# Patient Record
Sex: FEMALE | Race: WHITE | Employment: FULL TIME | URBAN - METROPOLITAN AREA
[De-identification: names, ages, dates, MRNs, and addresses within clinical notes are randomized per-mention and may not be internally consistent; named-entity substitution may affect disease eponyms.]

---

## 2017-04-03 ENCOUNTER — OFFICE VISIT (OUTPATIENT)
Dept: FAMILY MEDICINE | Facility: CLINIC | Age: 27
End: 2017-04-03
Payer: COMMERCIAL

## 2017-04-03 VITALS
DIASTOLIC BLOOD PRESSURE: 76 MMHG | HEIGHT: 67 IN | BODY MASS INDEX: 26.87 KG/M2 | SYSTOLIC BLOOD PRESSURE: 118 MMHG | WEIGHT: 171.2 LBS | TEMPERATURE: 98.7 F | HEART RATE: 76 BPM

## 2017-04-03 DIAGNOSIS — K64.4 EXTERNAL HEMORRHOIDS: Primary | ICD-10-CM

## 2017-04-03 DIAGNOSIS — K64.8 INTERNAL HEMORRHOIDS: ICD-10-CM

## 2017-04-03 PROCEDURE — 99213 OFFICE O/P EST LOW 20 MIN: CPT | Performed by: NURSE PRACTITIONER

## 2017-04-03 NOTE — NURSING NOTE
"Chief Complaint   Patient presents with     Constipation       Initial /76 (BP Location: Right arm, Patient Position: Chair, Cuff Size: Adult Regular)  Pulse 76  Temp 98.7  F (37.1  C) (Tympanic)  Ht 5' 7\" (1.702 m)  Wt 171 lb 3.2 oz (77.7 kg)  LMP 03/15/2017 (Exact Date)  Breastfeeding? No  BMI 26.81 kg/m2 Estimated body mass index is 26.81 kg/(m^2) as calculated from the following:    Height as of this encounter: 5' 7\" (1.702 m).    Weight as of this encounter: 171 lb 3.2 oz (77.7 kg).  Medication Reconciliation: complete     Sol Sena CMA (AAMA)      "

## 2017-04-03 NOTE — PATIENT INSTRUCTIONS
You do have both internal and external hemorrhoids  Stay well hydrated - urine should be clear.      Try a stool softer,    Try a probiotic,     Keep exercising,    try to not strain /push too hard with bowel movement s.     Use the Anusol suppositories for the internal hemorrhoids and the cream externally

## 2017-04-03 NOTE — PROGRESS NOTES
SUBJECTIVE:                                                    Natty Toussaint is a 26 year old female who presents to clinic today for the following health issues:      Constipation     Onset: 1 1/2 weeks ago    Description:   Frequency of bowel movements: had been very regular and is still having BMs daily but is feeling that she is having to try harder to go.  Stool consistency: Stool does not seem to have gotten harder, but seems narrower than she is used to     Progression of Symptoms:  improving    Accompanying Signs & Symptoms:  Abdominal pain (cramping?): no   Blood in stool: YES- has had light red blood when wiping after BM before. Has had hemorrhoids in the past. Did have a normal colonoscopy in 2014  Rectal pain: no   Nausea/vomiting: YES- intermittent nausea  Weight loss or gain: no    History:   History of abdominal surgery: no     Precipitating factors:   Recent use of narcotics, anticholinergics, calcium channel blockers, antacids, or iron supplements: no   Chronic Laxative Use: no        Therapies Tried and outcome: laxatives and stool softeners - does feel that the laxative made her stomach feel better more than the stool softener, but still not feeling quite like herself.     Had the colonoscopy when she was anemic and that  Was fine,     Problem list and histories reviewed & adjusted, as indicated.  Additional history: as documented    Patient Active Problem List   Diagnosis     Seasonal allergic rhinitis     Viral wart     CARDIOVASCULAR SCREENING; LDL GOAL LESS THAN 160     Well woman exam with routine gynecological exam     Iron deficiency anemia     Past Surgical History:   Procedure Laterality Date     COLONOSCOPY  7/30/2014    Procedure: COLONOSCOPY;  Surgeon: Naveed Choi MD;  Location: [facility]     ESOPHAGOSCOPY, GASTROSCOPY, DUODENOSCOPY (EGD), COMBINED  7/30/2014    Procedure: COMBINED ESOPHAGOSCOPY, GASTROSCOPY, DUODENOSCOPY (EGD);  Surgeon: Naveed Choi MD;   Location: [facility]     HC CAPSULE ENDOSCOPY N/A 1/14/2015    Procedure: CAPSULE/PILL CAM ENDOSCOPY;  Surgeon: Trever Laws MD;  Location: [facility]     NO HISTORY OF SURGERY         Social History   Substance Use Topics     Smoking status: Never Smoker     Smokeless tobacco: Never Used     Alcohol use Yes      Comment: occasional, on weekends     Family History   Problem Relation Age of Onset     DIABETES Maternal Grandmother      C.A.D. Maternal Grandfather      CANCER Maternal Grandfather      pancreatic ca     Thyroid Disease Father      Breast Cancer Maternal Aunt      Prostate Cancer Maternal Uncle      CEREBROVASCULAR DISEASE No family hx of      Cancer - colorectal No family hx of      Hypertension No family hx of      Asthma No family hx of          Current Outpatient Prescriptions   Medication Sig Dispense Refill     UNABLE TO FIND MEDICATION NAME: Patient states that she takes another thyroid medication in additional to levothyroxine, can not recall the name.       norgestrel-ethinyl estradiol (LO/OVRAL) 0.3-30 MG-MCG per tablet Take 1 tablet by mouth daily Medication is being filled for 1 time refill only due to:  Patient needs to be seen because it has been more than one year since last visit. 84 tablet 0     levothyroxine (SYNTHROID, LEVOTHROID) 50 MCG tablet   3     VITAMIN D, CHOLECALCIFEROL, PO Take 1,000 Units by mouth daily       Ascorbic Acid (VITAMIN C PO) Take 500 mg by mouth daily       IBUPROFEN PO Take 600 mg by mouth every 4 hours as needed for moderate pain Reported on 4/3/2017       SUMAtriptan (IMITREX) 50 MG tablet Take 1 tablet (50 mg) by mouth at onset of headache for migraine May repeat dose in 2 hours.  Do not exceed 200 mg in 24 hours (Patient not taking: Reported on 4/3/2017) 9 tablet 1     No Known Allergies  BP Readings from Last 3 Encounters:   04/03/17 118/76   08/04/16 124/86   01/27/16 109/66    Wt Readings from Last 3 Encounters:   04/03/17 171 lb 3.2 oz (77.7 kg)  "  08/04/16 161 lb 9.6 oz (73.3 kg)   01/27/16 166 lb 9.6 oz (75.6 kg)                    Reviewed and updated as needed this visit by clinical staff  Tobacco  Allergies  Meds  Med Hx  Surg Hx  Fam Hx  Soc Hx      Reviewed and updated as needed this visit by Provider         ROS:  C: NEGATIVE for fever, chills, change in weight  E/M: NEGATIVE for ear, mouth and throat problems,  Just started with a cold,  Runny, stuffy nose  R: NEGATIVE for significant cough or SOB  CV: NEGATIVE for chest pain, palpitations or peripheral edema  GI: POSITIVE for constipation  Started 1 1/2 weeks ago with  Hard stools, and straining with BM . The caliber of the stools is thinner .    Is training for a 1/2 marathon.  Will be getting  - Aug .  Most of the planning is done,   Is working 2 jobs.  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  Did have some blood in stool /aneamia  2 1/2 years ago and did have a colonoscopy and endoscopy   Testing is done.    All of the test were normal.   Didn't really fine out what was causing the anemia   Diet now is good.   Eat suhail seeds daily.   Has bee feeling more gassy            OBJECTIVE:                                                    /76 (BP Location: Right arm, Patient Position: Chair, Cuff Size: Adult Regular)  Pulse 76  Temp 98.7  F (37.1  C) (Tympanic)  Ht 5' 7\" (1.702 m)  Wt 171 lb 3.2 oz (77.7 kg)  LMP 03/15/2017 (Exact Date)  Breastfeeding? No  BMI 26.81 kg/m2  Body mass index is 26.81 kg/(m^2).  GENERAL: healthy, alert and no distress  HENT: normal cephalic/atraumatic, right ear: normal: no effusions, no erythema, normal landmarks, left ear: clear effusion, nasal mucosa edematous , rhinorrhea clear, oropharynx clear, oral mucous membranes moist and sinuses: not tender  NECK: no adenopathy, no asymmetry, masses, or scars and thyroid normal to palpation  RESP: lungs clear to auscultation - no rales, rhonchi or wheezes  CV: regular rate and rhythm, " normal S1 S2, no S3 or S4, no murmur, click or rub, no peripheral edema and peripheral pulses strong  ABDOMEN: soft, nontender, without hepatosplenomegaly or masses and bowel sounds normal  RECTAL (female): 1 externa at 6  o'clock hemorrhoid , rectal scope done.  There are multiple internal hemrrhoids that are inflamed  No active bleeding     Diagnostic Test Results:  none      ASSESSMENT/PLAN:                                                      ASSESSMENT/PLAN:      ICD-10-CM    1. External hemorrhoids K64.4 DISCONTINUED: hydrocortisone (ANUSOL-HC) 2.5 % cream   2. Internal hemorrhoids K64.8 DISCONTINUED: hydrocortisone (ANUSOL-HC) 2.5 % cream     DISCONTINUED: hydrocortisone (ANUSOL-HC) 2.5 % cream       Patient Instructions   You do have both internal and external hemorrhoids  Stay well hydrated - urine should be clear.      Try a stool softer,    Try a probiotic,     Keep exercising,    try to not strain /push too hard with bowel movement s.     Use the Anusol suppositories for the internal hemorrhoids and the cream externally                   MEDICATIONS:        - Start taking Anusol cream /suppositories        - Continue other medications without change  See Patient Instructions    ZACH LAM NP, APRN [facility]

## 2017-04-03 NOTE — MR AVS SNAPSHOT
After Visit Summary   4/3/2017    Natty Toussaint    MRN: [id number]           Patient Information     Date Of Birth          1990        Visit Information        Provider Department      4/3/2017 3:20 PM Macy Parsons APRN [facility]        Today's Diagnoses     External hemorrhoids    -  1    Internal hemorrhoids          Care Instructions    You do have both internal and external hemorrhoids  Stay well hydrated - urine should be clear.      Try a stool softer,    Try a probiotic,     Keep exercising,    try to not strain /push too hard with bowel movement s.     Use the Anusol suppositories for the internal hemorrhoids and the cream externally          Follow-ups after your visit        Who to contact     Normal or non-critical lab and imaging results will be communicated to you by [employer], letter or phone within 4 business days after the clinic has received the results. If you do not hear from us within 7 days, please contact the clinic through [employer] or phone. If you have a critical or abnormal lab result, we will notify you by phone as soon as possible.  Submit refill requests through [employer] or call your pharmacy and they will forward the refill request to us. Please allow 3 business days for your refill to be completed.          If you need to speak with a  for additional information , please call: [phone]           Additional Information About Your Visit        [employer] Information     [employer] gives you secure access to your electronic health record. If you see a primary care provider, you can also send messages to your care team and make appointments. If you have questions, please call your primary care clinic.  If you do not have a primary care provider, please call [phone] and they will assist you.        Care EveryWhere ID     This is your Care EveryWhere ID. This could be used by other organizations to access your [place]  "medical records  [id number]        Your Vitals Were     Pulse Temperature Height Last Period Breastfeeding? BMI (Body Mass Index)    76 98.7  F (37.1  C) (Tympanic) 5' 7\" (1.702 m) 03/15/2017 (Exact Date) No 26.81 kg/m2       Blood Pressure from Last 3 Encounters:   04/03/17 118/76   08/04/16 124/86   01/27/16 109/66    Weight from Last 3 Encounters:   04/03/17 171 lb 3.2 oz (77.7 kg)   08/04/16 161 lb 9.6 oz (73.3 kg)   01/27/16 166 lb 9.6 oz (75.6 kg)              Today, you had the following     No orders found for display         Today's Medication Changes          These changes are accurate as of: 4/3/17  4:33 PM.  If you have any questions, ask your nurse or doctor.               Start taking these medicines.        Dose/Directions    * hydrocortisone 2.5 % cream   Commonly known as:  ANUSOL-HC   Used for:  External hemorrhoids, Internal hemorrhoids   Started by:  Macy Parsons APRN CNP        Place rectally 2 times daily   Quantity:  30 g   Refills:  1       * hydrocortisone 2.5 % cream   Commonly known as:  ANUSOL-HC   Used for:  Internal hemorrhoids   Started by:  Macy Parsons APRN CNP        Place rectally 2 times daily   Quantity:  30 g   Refills:  3       * Notice:  This list has 2 medication(s) that are the same as other medications prescribed for you. Read the directions carefully, and ask your doctor or other care provider to review them with you.      Stop taking these medicines if you haven't already. Please contact your care team if you have questions.     ferrous sulfate 325 (65 FE) MG tablet   Commonly known as:  IRON   Stopped by:  Macy Parsons APRN CNP                Where to get your medicines      These medications were sent to [id number] - [place], [place] - [address] [place] [address]    Hours:  24-hours Phone:  [phone]     hydrocortisone 2.5 % cream    hydrocortisone 2.5 " % cream                Primary Care Provider Office Phone # Fax #    Zoya Delgado [phone]       [place] [id number] [place] [address]        Thank you!     Thank you for choosing [facility]  for your care. Our goal is always to provide you with excellent care. Hearing back from our patients is one way we can continue to improve our services. Please take a few minutes to complete the written survey that you may receive in the mail after your visit with us. Thank you!             Your Updated Medication List - Protect others around you: Learn how to safely use, store and throw away your medicines at www.disposemymeds.org.          This list is accurate as of: 4/3/17  4:33 PM.  Always use your most recent med list.                   Brand Name Dispense Instructions for use    * hydrocortisone 2.5 % cream    ANUSOL-HC    30 g    Place rectally 2 times daily       * hydrocortisone 2.5 % cream    ANUSOL-HC    30 g    Place rectally 2 times daily       IBUPROFEN PO      Take 600 mg by mouth every 4 hours as needed for moderate pain Reported on 4/3/2017       levothyroxine 50 MCG tablet    SYNTHROID/LEVOTHROID         norgestrel-ethinyl estradiol 0.3-30 MG-MCG per tablet    LO/OVRAL    84 tablet    Take 1 tablet by mouth daily Medication is being filled for 1 time refill only due to:  Patient needs to be seen because it has been more than one year since last visit.       SUMAtriptan 50 MG tablet    IMITREX    9 tablet    Take 1 tablet (50 mg) by mouth at onset of headache for migraine May repeat dose in 2 hours.  Do not exceed 200 mg in 24 hours       UNABLE TO FIND      MEDICATION NAME: Patient states that she takes another thyroid medication in additional to levothyroxine, can not recall the name.       VITAMIN C PO      Take 500 mg by mouth daily       VITAMIN D (CHOLECALCIFEROL) PO      Take 1,000 Units by mouth daily       * Notice:  This list has 2  medication(s) that are the same as other medications prescribed for you. Read the directions carefully, and ask your doctor or other care provider to review them with you.

## 2017-04-04 ENCOUNTER — TELEPHONE (OUTPATIENT)
Dept: FAMILY MEDICINE | Facility: CLINIC | Age: 27
End: 2017-04-04

## 2017-04-04 DIAGNOSIS — K64.8 INTERNAL HEMORRHOIDS: ICD-10-CM

## 2017-04-04 DIAGNOSIS — K64.4 EXTERNAL HEMORRHOIDS: Primary | ICD-10-CM

## 2017-04-04 NOTE — TELEPHONE ENCOUNTER
Patient called and wants to know why she was prescribed 2 hydrocortisone cream  when she was in yesterday.  Patient also would like to know if there is a generic form of the hydrocortisone cream its to expensive otherwise.  The pharmacy she wants it sent to [facility]e [facility]

## 2017-04-04 NOTE — TELEPHONE ENCOUNTER
Spoke provider and she stated the patient may have 3 refills.  Provider also recommended the hydrocortisone cream procto brand.  Writer sent in new rx for Procto brand.  Patient was notified of the additional refills and the change to the Procto brand for insurance.    Kimber LUI RN

## 2017-04-04 NOTE — TELEPHONE ENCOUNTER
Patient called LM that she was thinking she's getting an internal and external medication.  Would like to talk to the Nurse.    Jamee Ruelas [facility]

## 2017-04-28 ENCOUNTER — OFFICE VISIT (OUTPATIENT)
Dept: FAMILY MEDICINE | Facility: CLINIC | Age: 27
End: 2017-04-28
Payer: COMMERCIAL

## 2017-04-28 VITALS
HEART RATE: 79 BPM | DIASTOLIC BLOOD PRESSURE: 74 MMHG | HEIGHT: 67 IN | TEMPERATURE: 99.4 F | BODY MASS INDEX: 25.68 KG/M2 | WEIGHT: 163.6 LBS | SYSTOLIC BLOOD PRESSURE: 112 MMHG

## 2017-04-28 DIAGNOSIS — K59.00 CONSTIPATION, UNSPECIFIED CONSTIPATION TYPE: Primary | ICD-10-CM

## 2017-04-28 PROCEDURE — 99213 OFFICE O/P EST LOW 20 MIN: CPT | Performed by: FAMILY MEDICINE

## 2017-04-28 NOTE — NURSING NOTE
"Initial /74  Pulse 79  Temp 99.4  F (37.4  C) (Tympanic)  Ht 5' 7.25\" (1.708 m)  Wt 163 lb 9.6 oz (74.2 kg)  LMP 04/08/2017 (Exact Date)  Breastfeeding? No  BMI 25.43 kg/m2 Estimated body mass index is 25.43 kg/(m^2) as calculated from the following:    Height as of this encounter: 5' 7.25\" (1.708 m).    Weight as of this encounter: 163 lb 9.6 oz (74.2 kg). .    "

## 2017-04-28 NOTE — PROGRESS NOTES
SUBJECTIVE:                                                    Natty Toussaint is a 26 year old female who presents to clinic today for the following health issues:      Gastrointestinal symptoms      Duration: 1 month    Description:             ABDOMINAL PAIN - mid abdominal and lower back soreness.       Intensity:  moderate    Accompanying signs and symptoms:  Constipation and nausea    History  Previous {similar problem: YES- constipation. Pt taking stool softeners and that seems to be what is cause abdominal pain  Previous evaluation:  colonoscopy and upper GI    Aggravating factors: none    Alleviating factors: nothing    Other Therapies tried: exercises and lower fat diet     Started with consitpation that came out of the blue associated with nausea.      Still feeling the same after a couple of weeks.    Still having BM's regularly but can be difficult.  Stooling 1-2 times per day.  Consistency varies, sometimes runny and sometimes more formed.      No longer has nausea.  Has stabbing abdominal pain but only with stool sofetner.    Eating does not bother symptoms, feels like her appetite is normal.  Occasional bright red blood when wiping after BM.   Taking colace  Has not tried probitoc.     Problem list and histories reviewed & adjusted, as indicated.  Additional history: as documented    Reviewed and updated as needed this visit by clinical staff  Tobacco  Allergies  Meds  Med Hx  Surg Hx  Fam Hx  Soc Hx      Reviewed and updated as needed this visit by Provider  Tobacco  Med Hx  Surg Hx  Fam Hx  Soc Hx      ROS: Remainder of Constitutional, CV, Respiratory, GI,  negative with exception of that mentioned above    PE:  VS as above   Gen:  WN/WD/WH female in NAD   Heart:  RRR without murmur, nl S1, S2, no rubs or gallops   Lungs CTA zenobia without rales/ronchi/wheezes   Abd: soft, postive bowel sounds, NT/ND, no HSM, no rebounding/guarding/ridigity   Ext:  No pedal edema    A/p:      ICD-10-CM     1. Constipation, unspecified constipation type K59.00      Patient Instructions   We'll have you stop the Colace and begin some Benefiber once daily.  I would also recommend a probiotic.  Any of the over the counter ones stored with the pharmacist would be fine.    Let's give this another few weeks and see how it goes.  If no improvment we'll consider gastroenterology as our next step.    Please just keep me updated through [employer]

## 2017-04-28 NOTE — PATIENT INSTRUCTIONS
We'll have you stop the Colace and begin some Benefiber once daily.  I would also recommend a probiotic.  Any of the over the counter ones stored with the pharmacist would be fine.    Let's give this another few weeks and see how it goes.  If no improvment we'll consider gastroenterology as our next step.    Please just keep me updated through [employer]

## 2017-04-28 NOTE — MR AVS SNAPSHOT
After Visit Summary   4/28/2017    Natty Toussaint    MRN: [id number]           Patient Information     Date Of Birth          1990        Visit Information        Provider Department      4/28/2017 11:20 AM Zoya Delgado, DO [facility]        Care Instructions    We'll have you stop the Colace and begin some Benefiber once daily.  I would also recommend a probiotic.  Any of the over the counter ones stored with the pharmacist would be fine.    Let's give this another few weeks and see how it goes.  If no improvment we'll consider gastroenterology as our next step.    Please just keep me updated through [employer]            Follow-ups after your visit        Who to contact     Normal or non-critical lab and imaging results will be communicated to you by [employer], letter or phone within 4 business days after the clinic has received the results. If you do not hear from us within 7 days, please contact the clinic through [employer] or phone. If you have a critical or abnormal lab result, we will notify you by phone as soon as possible.  Submit refill requests through [employer] or call your pharmacy and they will forward the refill request to us. Please allow 3 business days for your refill to be completed.          If you need to speak with a  for additional information , please call: [phone]           Additional Information About Your Visit        [employer] Information     [employer] gives you secure access to your electronic health record. If you see a primary care provider, you can also send messages to your care team and make appointments. If you have questions, please call your primary care clinic.  If you do not have a primary care provider, please call [phone] and they will assist you.        Care EveryWhere ID     This is your Care EveryWhere ID. This could be used by other organizations to access your [place] medical records  [id number]        Your  "Vitals Were     Pulse Temperature Height Last Period Breastfeeding? BMI (Body Mass Index)    79 99.4  F (37.4  C) (Tympanic) 5' 7.25\" (1.708 m) 04/08/2017 (Exact Date) No 25.43 kg/m2       Blood Pressure from Last 3 Encounters:   04/28/17 112/74   04/03/17 118/76   08/04/16 124/86    Weight from Last 3 Encounters:   04/28/17 163 lb 9.6 oz (74.2 kg)   04/03/17 171 lb 3.2 oz (77.7 kg)   08/04/16 161 lb 9.6 oz (73.3 kg)              Today, you had the following     No orders found for display       Primary Care Provider Office Phone # Fax #    Zoya Morgan DO Danny [phone]       [facility] [address] [place] [address]        Thank you!     Thank you for choosing [facility]  for your care. Our goal is always to provide you with excellent care. Hearing back from our patients is one way we can continue to improve our services. Please take a few minutes to complete the written survey that you may receive in the mail after your visit with us. Thank you!             Your Updated Medication List - Protect others around you: Learn how to safely use, store and throw away your medicines at www.disposemymeds.org.          This list is accurate as of: 4/28/17 12:12 PM.  Always use your most recent med list.                   Brand Name Dispense Instructions for use    hydrocortisone 2.5 % cream    ANUSOL-HC    30 g    Place rectally 2 times daily       IBUPROFEN PO      Take 600 mg by mouth every 4 hours as needed for moderate pain Reported on 4/28/2017       levothyroxine 50 MCG tablet    SYNTHROID/LEVOTHROID         norgestrel-ethinyl estradiol 0.3-30 MG-MCG per tablet    LO/OVRAL    84 tablet    Take 1 tablet by mouth daily Medication is being filled for 1 time refill only due to:  Patient needs to be seen because it has been more than one year since last visit.       SUMAtriptan 50 MG tablet    IMITREX    9 tablet    Take 1 tablet (50 mg) by mouth at onset of headache for " migraine May repeat dose in 2 hours.  Do not exceed 200 mg in 24 hours       UNABLE TO FIND      MEDICATION NAME: Patient states that she takes another thyroid medication in additional to levothyroxine, can not recall the name.       VITAMIN C PO      Take 500 mg by mouth daily       VITAMIN D (CHOLECALCIFEROL) PO      Take 1,000 Units by mouth daily

## 2017-06-15 ENCOUNTER — MYC MEDICAL ADVICE (OUTPATIENT)
Dept: FAMILY MEDICINE | Facility: CLINIC | Age: 27
End: 2017-06-15

## 2017-06-27 ENCOUNTER — OFFICE VISIT (OUTPATIENT)
Dept: FAMILY MEDICINE | Facility: CLINIC | Age: 27
End: 2017-06-27
Payer: COMMERCIAL

## 2017-06-27 VITALS
TEMPERATURE: 97.6 F | DIASTOLIC BLOOD PRESSURE: 80 MMHG | BODY MASS INDEX: 25.8 KG/M2 | HEIGHT: 67 IN | HEART RATE: 68 BPM | SYSTOLIC BLOOD PRESSURE: 120 MMHG | WEIGHT: 164.4 LBS

## 2017-06-27 DIAGNOSIS — D50.8 OTHER IRON DEFICIENCY ANEMIA: ICD-10-CM

## 2017-06-27 DIAGNOSIS — M54.50 ACUTE BILATERAL LOW BACK PAIN WITHOUT SCIATICA: Primary | ICD-10-CM

## 2017-06-27 DIAGNOSIS — K58.2 IRRITABLE BOWEL SYNDROME WITH BOTH CONSTIPATION AND DIARRHEA: ICD-10-CM

## 2017-06-27 LAB
BASOPHILS # BLD AUTO: 0 10E9/L (ref 0–0.2)
BASOPHILS NFR BLD AUTO: 0.5 %
DIFFERENTIAL METHOD BLD: NORMAL
EOSINOPHIL # BLD AUTO: 0.4 10E9/L (ref 0–0.7)
EOSINOPHIL NFR BLD AUTO: 4.6 %
ERYTHROCYTE [DISTWIDTH] IN BLOOD BY AUTOMATED COUNT: 12.8 % (ref 10–15)
FERRITIN SERPL-MCNC: 36 NG/ML (ref 12–150)
HCT VFR BLD AUTO: 40.5 % (ref 35–47)
HGB BLD-MCNC: 14 G/DL (ref 11.7–15.7)
LYMPHOCYTES # BLD AUTO: 2 10E9/L (ref 0.8–5.3)
LYMPHOCYTES NFR BLD AUTO: 25 %
MCH RBC QN AUTO: 29.8 PG (ref 26.5–33)
MCHC RBC AUTO-ENTMCNC: 34.6 G/DL (ref 31.5–36.5)
MCV RBC AUTO: 86 FL (ref 78–100)
MONOCYTES # BLD AUTO: 0.7 10E9/L (ref 0–1.3)
MONOCYTES NFR BLD AUTO: 8.4 %
NEUTROPHILS # BLD AUTO: 5 10E9/L (ref 1.6–8.3)
NEUTROPHILS NFR BLD AUTO: 61.5 %
PLATELET # BLD AUTO: 222 10E9/L (ref 150–450)
RBC # BLD AUTO: 4.7 10E12/L (ref 3.8–5.2)
WBC # BLD AUTO: 8.1 10E9/L (ref 4–11)

## 2017-06-27 PROCEDURE — 36415 COLL VENOUS BLD VENIPUNCTURE: CPT | Performed by: FAMILY MEDICINE

## 2017-06-27 PROCEDURE — 85025 COMPLETE CBC W/AUTO DIFF WBC: CPT | Performed by: FAMILY MEDICINE

## 2017-06-27 PROCEDURE — 99214 OFFICE O/P EST MOD 30 MIN: CPT | Performed by: FAMILY MEDICINE

## 2017-06-27 PROCEDURE — 82728 ASSAY OF FERRITIN: CPT | Performed by: FAMILY MEDICINE

## 2017-06-27 RX ORDER — ZINC OXIDE 13 %
1 CREAM (GRAM) TOPICAL DAILY
COMMUNITY

## 2017-06-27 RX ORDER — LIOTHYRONINE SODIUM 5 UG/1
0.5 TABLET ORAL DAILY
Refills: 1 | COMMUNITY
Start: 2017-06-13

## 2017-06-27 NOTE — PATIENT INSTRUCTIONS
We'll have you visit with gastroenterology for your abdominal symptoms.  Please call to schedule.    I suspect your back pain is mostly related to a muscular problem.  Please schedule with physical therapy for evaluation and treatment.    I'll let you know your lab results when available.

## 2017-06-27 NOTE — PROGRESS NOTES
"  SUBJECTIVE:                                                    Natty Toussaint is a 26 year old female who presents to clinic today for the following health issues:    * low back pain  Feels sore and achy across the low back.  Hurts when bending over.  Feels okay if sitting or standing but bothers with activity.  Seems okay when running.  No radiation.  Back pain has been going on for 4-6 weeks.  No injury at onset.      Relates it to her bowels.  Feels like when she is having a bad stomach day this is worse too.  Has used heating pad with some relief.      * bowel issues - goes between constipation and diarrhea  Feels like things are better on the fiber but still having \"bad days\" about half the time.  Alternates between constipation and diarrhea.  No blood in the stool.  Has had a colonoscopy for previous bleeding episode which was normal    Problem list and histories reviewed & adjusted, as indicated.  Additional history: as documented    Reviewed and updated as needed this visit by clinical staff  Tobacco  Allergies  Meds  Med Hx  Surg Hx  Fam Hx  Soc Hx      Reviewed and updated as needed this visit by Provider  Tobacco  Med Hx  Surg Hx  Fam Hx  Soc Hx        ROS: Remainder of Constitutional, CV, Respiratory, GI,  negative with exception of that mentioned above    PE:  VS as above   Gen:  WN/WD/WH female in NAD   Heart:  RRR without murmur, nl S1, S2, no rubs or gallops   Lungs CTA zenobia without rales/ronchi/wheezes   Abd: soft, postive bowel sounds, NT/ND, no HSM, no rebounding/guarding/ridigity   MSK:  Full pain free ROM of low back.  C/o mild tenderness on palpation throughout lumbar spine.  Able to heel and toe walk.  Muscle strength intact at 5/5 in LE zenobia.  Sensation intact to light touch.  Seated and supine SLR negative   Ext:  No pedal edema    A/P:      ICD-10-CM    1. Acute bilateral low back pain without sciatica M54.5 [facility] PT, HAND, AND CHIROPRACTIC REFERRAL   2. Irritable bowel " syndrome with both constipation and diarrhea K58.2 GASTROENTEROLOGY ADULT REF CONSULT ONLY   3. Other iron deficiency anemia D50.8 CBC with platelets and differential     Ferritin     Patient Instructions   We'll have you visit with gastroenterology for your abdominal symptoms.  Please call to schedule.    I suspect your back pain is mostly related to a muscular problem.  Please schedule with physical therapy for evaluation and treatment.    I'll let you know your lab results when available.

## 2017-06-27 NOTE — NURSING NOTE
"Initial /80  Pulse 68  Temp 97.6  F (36.4  C) (Tympanic)  Ht 5' 7.2\" (1.707 m)  Wt 164 lb 6.4 oz (74.6 kg)  LMP 06/21/2017  Breastfeeding? No  BMI 25.6 kg/m2 Estimated body mass index is 25.6 kg/(m^2) as calculated from the following:    Height as of this encounter: 5' 7.2\" (1.707 m).    Weight as of this encounter: 164 lb 6.4 oz (74.6 kg). .      "

## 2017-06-27 NOTE — MR AVS SNAPSHOT
After Visit Summary   6/27/2017    Natty Toussaint    MRN: [id number]           Patient Information     Date Of Birth          1990        Visit Information        Provider Department      6/27/2017 2:00 PM Zoya Delgado DO [facility]        Today's Diagnoses     Acute bilateral low back pain without sciatica    -  1    Other iron deficiency anemia        Irritable bowel syndrome with both constipation and diarrhea          Care Instructions    We'll have you visit with gastroenterology for your abdominal symptoms.  Please call to schedule.    I suspect your back pain is mostly related to a muscular problem.  Please schedule with physical therapy for evaluation and treatment.    I'll let you know your lab results when available.                    Follow-ups after your visit        Additional Services     GASTROENTEROLOGY ADULT REF CONSULT ONLY       Preferred Location: [facility] [phone]      Please be aware that coverage of these services is subject to the terms and limitations of your health insurance plan.  Call member services at your health plan with any benefit or coverage questions.  Any procedures must be performed at a [facility] facility OR coordinated by your clinic's referral office.    Please bring the following with you to your appointment:    (1) Any X-Rays, CTs or MRIs which have been performed.  Contact the facility where they were done to arrange for  prior to your scheduled appointment.    (2) List of current medications   (3) This referral request   (4) Any documents/labs given to you for this referral            [facility] PT, HAND, AND CHIROPRACTIC REFERRAL       **This order will print in the [facility] Scheduling Office**    Physical Therapy, Hand Therapy and Chiropractic Care are available through:    *[facility]  *[facility]  *[facility]    Call one number to schedule at any of the above locations:  [phone].    Your provider has referred you to: Physical Therapy at [facility] or [facility]    Indication/Reason for Referral: Low Back Pain  Onset of Illness: month  Therapy Orders: Evaluate and Treat  Special Programs: None  Special Request: None    Margy Ji      Additional Comments for the Therapist or Chiropractor:     Please be aware that coverage of these services is subject to the terms and limitations of your health insurance plan.  Call member services at your health plan with any benefit or coverage questions.      Please bring the following to your appointment:    *Your personal calendar for scheduling future appointments  *Comfortable clothing                  Who to contact     Normal or non-critical lab and imaging results will be communicated to you by [employer], letter or phone within 4 business days after the clinic has received the results. If you do not hear from us within 7 days, please contact the clinic through [employer] or phone. If you have a critical or abnormal lab result, we will notify you by phone as soon as possible.  Submit refill requests through [employer] or call your pharmacy and they will forward the refill request to us. Please allow 3 business days for your refill to be completed.          If you need to speak with a  for additional information , please call: [phone]           Additional Information About Your Visit        [employer] Information     [employer] gives you secure access to your electronic health record. If you see a primary care provider, you can also send messages to your care team and make appointments. If you have questions, please call your primary care clinic.  If you do not have a primary care provider, please call [phone] and they will assist you.        Care EveryWhere ID     This is your Care EveryWhere ID. This could be used by other organizations to access your [facility] medical records  [id number]        Your Vitals Were     Pulse  "Temperature Height Last Period Breastfeeding? BMI (Body Mass Index)    68 97.6  F (36.4  C) (Tympanic) 5' 7.2\" (1.707 m) 06/21/2017 No 25.6 kg/m2       Blood Pressure from Last 3 Encounters:   06/27/17 120/80   04/28/17 112/74   04/03/17 118/76    Weight from Last 3 Encounters:   06/27/17 164 lb 6.4 oz (74.6 kg)   04/28/17 163 lb 9.6 oz (74.2 kg)   04/03/17 171 lb 3.2 oz (77.7 kg)              We Performed the Following     CBC with platelets and differential     Ferritin     GASTROENTEROLOGY ADULT REF CONSULT ONLY     [facility] PT, HAND, AND CHIROPRACTIC REFERRAL        Primary Care Provider Office Phone # Fax #    Zoya Morgan DO Danny [phone]       [facility] [address]   [facility] [address]        Equal Access to Services     KAY DRAPER AH: Hadii aad ku hadasho Soomaali, waaxda luqadaha, qaybta kaalmada adeegyada, waxay marjoriein haytono muller . So [facility] [phone].    ATENCIÓN: Si maverickla debbie, tiene a jade disposición servicios gratuitos de asistencia lingüística. Llame al [phone].    We comply with applicable federal civil rights laws and [place] laws. We do not discriminate on the basis of race, color, national origin, age, disability sex, sexual orientation or gender identity.            Thank you!     Thank you for choosing [facility]  for your care. Our goal is always to provide you with excellent care. Hearing back from our patients is one way we can continue to improve our services. Please take a few minutes to complete the written survey that you may receive in the mail after your visit with us. Thank you!             Your Updated Medication List - Protect others around you: Learn how to safely use, store and throw away your medicines at www.disposemymeds.org.          This list is accurate as of: 6/27/17  2:36 PM.  Always use your most recent med list.                   Brand Name Dispense Instructions for use Diagnosis    IBUPROFEN PO      " Take 600 mg by mouth every 4 hours as needed for moderate pain Reported on 4/28/2017    Iron deficiency anemia, unspecified       levothyroxine 50 MCG tablet    SYNTHROID/LEVOTHROID          liothyronine 5 MCG tablet    CYTOMEL     Take 0.5 tablets by mouth daily        METAMUCIL FIBER PO      Take by mouth daily        norgestrel-ethinyl estradiol 0.3-30 MG-MCG per tablet    LO/OVRAL    84 tablet    Take 1 tablet by mouth daily Medication is being filled for 1 time refill only due to:  Patient needs to be seen because it has been more than one year since last visit.    Well woman exam with routine gynecological exam       PROBIOTIC DAILY Caps      Take 1 capsule by mouth daily        SUMAtriptan 50 MG tablet    IMITREX    9 tablet    Take 1 tablet (50 mg) by mouth at onset of headache for migraine May repeat dose in 2 hours.  Do not exceed 200 mg in 24 hours    Headache(784.0)       VITAMIN C PO      Take 500 mg by mouth daily    Iron deficiency anemia, unspecified       VITAMIN D (CHOLECALCIFEROL) PO      Take 1,000 Units by mouth daily    Iron deficiency anemia, unspecified

## 2017-07-10 ENCOUNTER — TRANSFERRED RECORDS (OUTPATIENT)
Dept: HEALTH INFORMATION MANAGEMENT | Facility: CLINIC | Age: 27
End: 2017-07-10

## 2018-05-13 ENCOUNTER — HEALTH MAINTENANCE LETTER (OUTPATIENT)
Age: 28
End: 2018-05-13

## 2020-02-17 ENCOUNTER — HEALTH MAINTENANCE LETTER (OUTPATIENT)
Age: 30
End: 2020-02-17

## 2020-11-29 ENCOUNTER — HEALTH MAINTENANCE LETTER (OUTPATIENT)
Age: 30
End: 2020-11-29

## 2021-04-10 ENCOUNTER — HEALTH MAINTENANCE LETTER (OUTPATIENT)
Age: 31
End: 2021-04-10

## 2021-09-25 ENCOUNTER — HEALTH MAINTENANCE LETTER (OUTPATIENT)
Age: 31
End: 2021-09-25

## 2022-05-01 ENCOUNTER — HEALTH MAINTENANCE LETTER (OUTPATIENT)
Age: 32
End: 2022-05-01

## 2022-12-26 ENCOUNTER — HEALTH MAINTENANCE LETTER (OUTPATIENT)
Age: 32
End: 2022-12-26

## 2023-06-02 ENCOUNTER — HEALTH MAINTENANCE LETTER (OUTPATIENT)
Age: 33
End: 2023-06-02